# Patient Record
Sex: FEMALE | Race: WHITE
[De-identification: names, ages, dates, MRNs, and addresses within clinical notes are randomized per-mention and may not be internally consistent; named-entity substitution may affect disease eponyms.]

---

## 2019-04-03 ENCOUNTER — HOSPITAL ENCOUNTER (OUTPATIENT)
Dept: HOSPITAL 76 - SDS | Age: 64
Discharge: HOME | End: 2019-04-03
Attending: ORTHOPAEDIC SURGERY
Payer: COMMERCIAL

## 2019-04-03 VITALS — DIASTOLIC BLOOD PRESSURE: 66 MMHG | SYSTOLIC BLOOD PRESSURE: 128 MMHG

## 2019-04-03 DIAGNOSIS — M75.51: ICD-10-CM

## 2019-04-03 DIAGNOSIS — M75.101: Primary | ICD-10-CM

## 2019-04-03 DIAGNOSIS — M75.41: ICD-10-CM

## 2019-04-03 DIAGNOSIS — Z87.891: ICD-10-CM

## 2019-04-03 LAB
ANION GAP SERPL CALCULATED.4IONS-SCNC: 11 MMOL/L (ref 6–13)
BASOPHILS NFR BLD AUTO: 0.1 10^3/UL (ref 0–0.1)
BASOPHILS NFR BLD AUTO: 2 %
BUN SERPL-MCNC: 22 MG/DL (ref 6–20)
CALCIUM UR-MCNC: 9.5 MG/DL (ref 8.5–10.3)
CHLORIDE SERPL-SCNC: 105 MMOL/L (ref 101–111)
CO2 SERPL-SCNC: 22 MMOL/L (ref 21–32)
CREAT SERPLBLD-SCNC: 0.8 MG/DL (ref 0.4–1)
EOSINOPHIL # BLD AUTO: 0.1 10^3/UL (ref 0–0.7)
EOSINOPHIL NFR BLD AUTO: 2.2 %
ERYTHROCYTE [DISTWIDTH] IN BLOOD BY AUTOMATED COUNT: 13 % (ref 12–15)
GFRSERPLBLD MDRD-ARVRAT: 72 ML/MIN/{1.73_M2} (ref 89–?)
GLUCOSE SERPL-MCNC: 117 MG/DL (ref 70–100)
HGB UR QL STRIP: 15.1 G/DL (ref 12–16)
LYMPHOCYTES # SPEC AUTO: 1.5 10^3/UL (ref 1.5–3.5)
LYMPHOCYTES NFR BLD AUTO: 26.6 %
MCH RBC QN AUTO: 32 PG (ref 27–31)
MCHC RBC AUTO-ENTMCNC: 34.5 G/DL (ref 32–36)
MCV RBC AUTO: 92.7 FL (ref 81–99)
MONOCYTES # BLD AUTO: 0.4 10^3/UL (ref 0–1)
MONOCYTES NFR BLD AUTO: 7.6 %
NEUTROPHILS # BLD AUTO: 3.5 10^3/UL (ref 1.5–6.6)
NEUTROPHILS # SNV AUTO: 5.7 X10^3/UL (ref 4.8–10.8)
NEUTROPHILS NFR BLD AUTO: 61.6 %
PDW BLD AUTO: 8.7 FL (ref 7.9–10.8)
PLATELET # BLD: 217 10^3/UL (ref 130–450)
RBC MAR: 4.72 10^6/UL (ref 4.2–5.4)
SODIUM SERPLBLD-SCNC: 138 MMOL/L (ref 135–145)

## 2019-04-03 PROCEDURE — 0LS14ZZ REPOSITION RIGHT SHOULDER TENDON, PERCUTANEOUS ENDOSCOPIC APPROACH: ICD-10-PCS | Performed by: ORTHOPAEDIC SURGERY

## 2019-04-03 PROCEDURE — 29826 SHO ARTHRS SRG DECOMPRESSION: CPT

## 2019-04-03 PROCEDURE — 0LQ14ZZ REPAIR RIGHT SHOULDER TENDON, PERCUTANEOUS ENDOSCOPIC APPROACH: ICD-10-PCS | Performed by: ORTHOPAEDIC SURGERY

## 2019-04-03 PROCEDURE — 0RBJ4ZZ EXCISION OF RIGHT SHOULDER JOINT, PERCUTANEOUS ENDOSCOPIC APPROACH: ICD-10-PCS | Performed by: ORTHOPAEDIC SURGERY

## 2019-04-03 PROCEDURE — 29828 SHO ARTHRS SRG BICP TENODSIS: CPT

## 2019-04-03 PROCEDURE — 29827 SHO ARTHRS SRG RT8TR CUF RPR: CPT

## 2019-04-03 PROCEDURE — 80048 BASIC METABOLIC PNL TOTAL CA: CPT

## 2019-04-03 PROCEDURE — 0RNJ4ZZ RELEASE RIGHT SHOULDER JOINT, PERCUTANEOUS ENDOSCOPIC APPROACH: ICD-10-PCS | Performed by: ORTHOPAEDIC SURGERY

## 2019-04-03 PROCEDURE — 85025 COMPLETE CBC W/AUTO DIFF WBC: CPT

## 2019-04-03 PROCEDURE — 93005 ELECTROCARDIOGRAM TRACING: CPT

## 2019-04-03 NOTE — ANESTHESIA
Pre-Anesthesia VS, & Labs





- Diagnosis


right rotator cuff tear, biceps injury, subachromial impingement, bursitis








- Procedure





Rotator cuff repair, shoulder arthroscopy, subacromial decompression, biceps 

tenodesis


Vital Signs: 





                                        











Temp Pulse Resp BP Pulse Ox


 


 36 C L  66   12   151/75 H  100 


 


 04/03/19 07:25  04/03/19 07:25  04/03/19 07:25  04/03/19 07:25  04/03/19 07:25














                                        





Height                           5 ft 5 in


Weight (kg)                      83.8 kg











- NPO


>8 hours





- Pregnancy


Is Patient Pregnant?: No





Home Medications and Allergies


Allergies/Adverse Reactions: 


                                    Allergies











Allergy/AdvReac Type Severity Reaction Status Date / Time


 


No Known Drug Allergies Allergy   Verified 04/02/19 14:07














Anes History & Medical History





- Anesthetic History


Anesthesia Complications: reports: No previous complications





- Medical History


Cardiovascular: reports: None


Pulmonary: reports: None


Gastrointestinal: reports: None


Urinary: reports: None


Musculoskeletal: reports: None


Endocrine/Autoimmune: reports: None


Skin: reports: None





- Surgical History


General: Colonoscopy





Exam


General: Alert


Dental: WNL


Mouth Opening: Greater than 4 Fingerbreadths


Mallampati classification: II


Thyromental Distance: greater than 6 cm


Respiratory: Lungs clear


Cardiovascular: Regular rate





Plan


Anesthesia Type: General


Consent for Procedure(s) Verified and Reviewed: Yes


Code Status: Attempt Resuscitation


ASA classification: 2-Mild systemic disease


Is this case an emergency?: No

## 2019-04-03 NOTE — IMMEDIATE POSTOPERATIVE NOTE
Immediate Postoperative Note





- Procedure Note


Procedure Date: 04/03/19


Pre-Op Diagnosis: RIGHT SHOULDER RCT, MARLEE, LH BICEPS INJURY


Procedure: RIGHT SHOULDER SCOPE RCR, SAD, LH BICEPS TENODESIS


Post-Op Diagnosis: SAME


Primary Surgeon: DEYA MALDONADO


Assistant: JUSTIN


Anesthesia Type: General ET tube, Local, Regional block


Complications: No complications


Estimated Blood Loss (in cc): 25


Plan of Care: 


PT TOLERATED PROCEDURE WELL. INSTRUMENT AND SPONGE COUNTS CORRECT. PT 

TRANSFERRED TO  IN STABLE CONDITION. WILL FOLLOW STD POST R SHOULDER RCR/LH 

BICEPS TENODESIS PROTOCOL

## 2019-04-04 NOTE — OPERATIVE REPORT
DATE OF SERVICE: 04/03/2019

Physician: Hilton Wetzel MD

 

SURGEON:  Hilton Wetzel MD

 

ASSISTANT:  None.

 

ANESTHESIA PROVIDER:  Caryn Fagan CRNA

 

PREOPERATIVE DIAGNOSES

1.  Right shoulder rotator cuff tear, supraspinatus.

2.  Right shoulder rotator cuff tear, subscapularis.

2.  Right shoulder subacromial impingement.

3.  Right shoulder long head biceps injury.

 

POSTOPERATIVE DIAGNOSES

1.  Right shoulder rotator cuff tear, supraspinatus.

2.  Right shoulder rotator cuff tear, subscapularis.

2.  Right shoulder subacromial impingement.

3.  Right shoulder long head biceps injury.

 

PROCEDURES

1.  Right shoulder arthroscopic rotator cuff repair, supraspinatus in 
subacromial space.

2.  Right shoulder arthroscopic rotator cuff repair, subscapularis in 
glenohumeral joint.

3.  Right shoulder subacromial decompression, arthroscopic conversion to type 1 
acromion.

4.  Right shoulder arthroscopic long head biceps tenodesis.

 

HISTORY OF PRESENT ILLNESS AND INDICATIONS:  Patient is a 64-year-old female 
known to have a left shoulder rotator cuff and associated injuries.  She was 
indicated for operative treatment.  Please see previous discussion in the clinic
with risks, benefits, and alternatives reviewed.  These are again highlighted 
with the patient and the patient's  in the preoperative care unit.  Their
questions are answered.  Patient verbalized understanding of the above and 
verbalized her wish to proceed with operative treatment.  Informed consent was 
given.

 

INTRAOPERATIVE FINDINGS:  Patient was noted to have a partially torn superior 
aspect of the subscapularis.  Long head biceps was significantly frayed and 
subluxed.  Anterior aspect of the supraspinatus had a full-thickness tear.  
Subacromial space shows fibrosis, some bursitis, and downsloping of the anterior
aspect of the acromion.

 

PROCEDURE:  On 04/03/2019, patient is identified in the preoperative care unit. 
She identifies her right shoulder as the operative site.  This is signed by the 
operating surgeon.  Patient receives ultrasound-guided right side regional block
by Anesthesia, and then is brought to the operating room, where general 
anesthesia is administered.  Head, neck, and extremities placed in anatomically 
comfortable and safe positions after the patient is placed in a left side down 
lateral decubitus position with appropriately placed axillary roll to avoid 
encumbrance of the axilla.  Down leg is gel padded.  SCD boots are in place. 

 

After safe positioning, patient's right upper extremity is draped out, then pre-
scrubbed with Hibiclens solution, and then prepped and draped in the usual 
sterile fashion.  A combination of 5, 10, or 15 pounds of traction are used 
during various portions of the case.

 

At this point, surgical pause identifies the right shoulder as the operative 
site.  Local anesthetic is infused posteriorly, anteriorly, and laterally.

 

Small stab incision made posteriorly.  Scope is introduced into the glenohumeral
joint.  Outside-in portal is used to create anterior portal, and then the 
undersurface of the rotator cuff tear is debrided.  The biceps is then tagged 
using a #2 FiberWire and a FastPass Scorpion device.  This is followed by 
preparing the subscapularis torn portion with shaving the end to freshen this up
for biologic response.  The footprint is then shaved as well, and then a 
horizontal mattress suture is placed using the Scorpion device.  This is 
followed by punching and then placement of a 5.5 BioComposite SwiveLock into the
rotator cuff footprint, thereby re-apposing the superior border of the 
subscapularis to its near anatomic footprint.  Suture limbs are cut.  This is 
noted to have good integrity of this repair, and then attention is directed 
towards the subacromial space.

 

At this point, a lateral incision is made.  A subacromial decompression is 
performed using a combination of mechanical shaver, arthroscopic heating device 
with appropriate flow to avoid thermal injury, and then a marla to convert the 
anterior aspect of the acromion to a type 1.  At this point, the edge of the 
rotator cuff is debrided sharply using a meniscal basket, and then the biceps is
identified through the hole in the rotator cuff.  The rotator cuff footprint and
the superior aspect of the bicipital groove are then prepared using a shaver and
ultimately a marla to freshly bleeding bone, at which point, the biceps tendon is
brought through an anterolateral auxiliary incision, and then whipstitch #2 
FiberWire is placed.  This is sized to be 7-1/2, at which point, a size 8 reamer
and drill pin are placed in the superior aspect of the bicipital groove, and 
then using an 8 mm SwiveLock device, the biceps is pushed into this biceps 
socket, followed by fixation using the interference fit of the SwiveLock.  This 
fixes the biceps nicely in the bicipital groove and the suture limbs are cut.  
The integrity of this repair is noted to be good. 

 

At this point, attention is directed towards rotator cuff repair, where a 
triple-loaded anchor is placed in the rotator cuff after punching.  The anchor 
is then noted to be in stable, and then 3 sutures are passed, the first anterior
and middle are passed in a simple suture-type fashion.  The posterior is placed 
in a horizontal mattress-type fashion.  These are tied in reverse order, thereby
re-opposing the rotator cuff to its near anatomic footprint.  This is probed and
noted to be stable.  The subacromial space is copiously irrigated and noted to 
be free of loose debris, and then the instruments are removed, subacromial space
evacuated, local anesthetic is infused in the soft tissues, and then 
arthroscopic portals are closed using interrupted nylon suture.  Skin is washed 
and dried.  Xeroform dressing is applied.  Dry sterile dressing is applied.  
Micropore tape is applied.  Patient is placed in abductor pillow sling.

 

Patient tolerated the procedure well.  Instrument and sponge counts were 
correct.  Patient was transferred to recovery room in stable condition.

 

Patient's  is contacted in the waiting room.  The case is discussed, 
arthroscopic photos reviewed, postoperative instructions highlighted, 
perioperative medication plan reviewed.  Previously, patient denied any 
contraindication to medication plan.  She will be on perioperative antibiotics. 
Advised to take over-the-counter bowel regimen medication while on narcotic 
analgesics and given a prescription for narcotic analgesics.  Perioperative 
antibiotics for 24 hours only.

 

Patient will follow up in 10-14 days.  She will follow standard postoperative 
rotator cuff and biceps protocol, which is reviewed.  Patient's 's 
questions are answered.  He verbalizes understanding and satisfaction with plan 
as outlined.

 

FLUIDS:  500 mL lactated Ringer's.

 

COMPRESSION DEVICE:  Bilateral calf SCD boots.

 

PREOPERATIVE ANTIBIOTICS:  Weight-based IV Ancef.

 

ORTHOPEDIC IMPLANTS

1.  Arthrex 8 x 19 mm SwiveLock. 

2.  Arthrex 5.5 x 19 mm SwiveLock.

3.  5.5 x 14.7 Arthrex Triple Play anchor.

 

LOCAL ANESTHETIC:  20 mL 0.25% Marcaine with epinephrine local.

 

 

DD: 04/04/2019 13:54

TD: 04/04/2019 14:08

Job #: [id number]

[facility]